# Patient Record
(demographics unavailable — no encounter records)

---

## 2025-01-29 NOTE — HISTORY OF PRESENT ILLNESS
[FreeTextEntry1] : Glynn is a pleasant 2  old boy who came today to my office with his mom for evaluation of his lower extremity.  Per mom he was born in emergency C section d/y umbilical cord around his neck.  He was in NICU for 9 days He was evaluated recently by neurology who diagnosed him with Autism and hypotonia. He is having PT/OT/SPEECH. still did not star ambulating or cursing, as difficulties pulling himself up.  Here for evaluation. Please refer to last note from previous treatment and further details. Last visit we provided him a script for bilateral AFO. Today, Glynn Is a 2-year-old boy with a history of delayed milestones and not walking.  He presents today primarily for a brace check.  He is currently wearing his new solid AFOs.  He only ambulates with the help of a walker.  He is in PT and OT.  He presents today with his mother for pediatric orthopedic follow-up exam.

## 2025-01-29 NOTE — ASSESSMENT
[FreeTextEntry1] : 2 years old boy with sever lower extremity hypotonia, developmental delay, Autism Today's visit included obtaining history from the child parent due to the child's age, the child could not be considered a reliable historian, requiring parent to act as independent historian.  His brace is fitting appropriately.  The recommendations time would be to continue the braces only wearing them when he is attempting to walk.  He may continue with PT and OT.  He will follow-up in 6 months for repeat examination. This entire examination and visit will be translated into [place]an.  We had a thorough talk in regard to the diagnosis, prognosis and treatment modalities.  All questions and concerns were addressed today. There was a verbal understanding from the parents and patient.   RASHEEDA Montoya have acted as a scribe and documented the above information for Dr. Price.   This note was generated using Dragon medical dictation software. A reasonable effort has been made for proofreading its contents; however, typos may still remain. If there are any questions or points of clarification needed, please do not hesitate to contact my office.   The above documentation completed by the scribe is an accurate record of both my words and actions.   Dr. Price.

## 2025-01-29 NOTE — BIRTH HISTORY
[] :  [Was child in NICU?] : Child was in NICU [Normal?] : pregnancy not normal [FreeTextEntry7] : 9 days

## 2025-01-29 NOTE — PHYSICAL EXAM
[FreeTextEntry1] : General: Patient is awake and alert and in no acute distress . well nourished,  Skin: The skin is intact, warm, pink, and dry over the area examined.    Eyes: normal conjunctiva, normal eyelids and pupils were equal and round.   ENT: normal ears, normal nose and normal lips.  Cardiovascular: There is brisk capillary refill in the digits of the affected extremity. They are symmetric pulses in the bilateral upper and lower extremities, positive peripheral pulses, brisk capillary refill, but no peripheral edema.  Respiratory: The patient is in no apparent respiratory distress. They're taking full deep breaths without use of accessory muscles or evidence of audible wheezes or stridor without the use of a stethoscope, normal respiratory effort.   Musculoskeletal: . Examination of bilateral lower extremities reveals wide symmetric abduction of bilateral hips to greater than 60. Prone internal rotation to 70, prone external rotation to 50. Thigh foot angle is 10 bilaterally.  Bilateral knees with full range of motion. Both knees are clinically stable. Negative Galeazzi.  Bilateral ankle dorsiflexion to +20 with knees extended. sever flexible flatfoot deformity. With standing, arches collapse and heels tip into valgus. This is flexible and easily correctable with toe dorsiflexion. Subtalar motion is full and free.  The brace is fitting appropriately with no skin breakdown or pressure marks. The padding is in the appropriate places. The straps are in the appropriate placement.

## 2025-01-29 NOTE — REASON FOR VISIT
[Initial Evaluation] : an initial evaluation [Mother] : mother [FreeTextEntry1] : Delay milestones, not walking [TWNoteComboBox1] : [place] citizen

## 2025-01-29 NOTE — REASON FOR VISIT
[Initial Evaluation] : an initial evaluation [Mother] : mother [FreeTextEntry1] : Delay milestones, not walking [TWNoteComboBox1] : [place]

## 2025-01-29 NOTE — ASSESSMENT
[FreeTextEntry1] : 2 years old boy with sever lower extremity hypotonia, developmental delay, Autism Today's visit included obtaining history from the child parent due to the child's age, the child could not be considered a reliable historian, requiring parent to act as independent historian.  His brace is fitting appropriately.  The recommendations time would be to continue the braces only wearing them when he is attempting to walk.  He may continue with PT and OT.  He will follow-up in 6 months for repeat examination. This entire examination and visit will be translated into Ivorian.  We had a thorough talk in regard to the diagnosis, prognosis and treatment modalities.  All questions and concerns were addressed today. There was a verbal understanding from the parents and patient.   RASHEEDA Montoya have acted as a scribe and documented the above information for Dr. Price.   This note was generated using Dragon medical dictation software. A reasonable effort has been made for proofreading its contents; however, typos may still remain. If there are any questions or points of clarification needed, please do not hesitate to contact my office.   The above documentation completed by the scribe is an accurate record of both my words and actions.   Dr. Price.

## 2025-01-29 NOTE — REVIEW OF SYSTEMS
[Change in Activity] : no change in activity [Fever Above 102] : no fever [Rash] : no rash [Itching] : no itching [Eye Pain] : no eye pain [Redness] : no redness [Earache] : no earache [Wheezing] : no wheezing [Cough] : no cough [Joint Pains] : no arthralgias [Joint Swelling] : no joint swelling [Appropriate Age Development] : development not appropriate for age

## 2025-06-27 NOTE — REASON FOR VISIT
[Initial Evaluation] : an initial evaluation [Mother] : mother [FreeTextEntry1] : Delay milestones, not walking indipendently [TWNoteComboBox1] : Citizen of [place]

## 2025-06-27 NOTE — HISTORY OF PRESENT ILLNESS
[FreeTextEntry1] : Glynn is a pleasant 2.10  old boy who came today to my office with his mom for  further management  of his lower extremity.  Per mom he was born in emergency C section d/y umbilical cord around his neck.  He was in NICU for 9 days He was evaluated recently by neurology who diagnosed him with Autism and hypotonia. He is having PT/OT/SPEECH. still did not star ambulating or cursing, as difficulties pulling himself up.  Here for evaluation. Please refer to last note from previous treatment and further details. Last visit we provided him a script for bilateral AFO. but mom is not able to get them He only ambulates with the help of a walker.  He is in PT and OT.  He presents today with his mother for pediatric orthopedic follow-up exam.

## 2025-06-27 NOTE — ASSESSMENT
[FreeTextEntry1] : 2.10 years old boy with sever lower extremity hypotonia, developmental delay, Autism Today's visit included obtaining history from the child parent due to the child's age, the child could not be considered a reliable historian, requiring parent to act as independent historian.  He outgrown his brace and needs a new brace to accommodate his growth.  The recommendations time would be to continue the braces only wearing them when he is attempting to walk. New script was provided today He should  continue with PT and OT.  He will follow-up in 6 months for repeat examination. This entire examination and visit will be translated into Macanese.  We had a thorough talk in regard to the diagnosis, prognosis and treatment modalities.  All questions and concerns were addressed today. There was a verbal understanding from the parents and patient.   RASHEEDA Montoya have acted as a scribe and documented the above information for Dr. Price.   This note was generated using Dragon medical dictation software. A reasonable effort has been made for proofreading its contents; however, typos may still remain. If there are any questions or points of clarification needed, please do not hesitate to contact my office.   The above documentation completed by the scribe is an accurate record of both my words and actions.   Dr. Price.

## 2025-06-27 NOTE — REASON FOR VISIT
[Initial Evaluation] : an initial evaluation [Mother] : mother [FreeTextEntry1] : Delay milestones, not walking indipendently [TWNoteComboBox1] : [place]

## 2025-06-27 NOTE — END OF VISIT
[FreeTextEntry3] : I, Domenico Price MD, I personally performed the services described in the documentation, reviewed the documentation recorded by the scribe in my presence and it accurately and completely records my words and actions

## 2025-06-27 NOTE — ASSESSMENT
[FreeTextEntry1] : 2.10 years old boy with sever lower extremity hypotonia, developmental delay, Autism Today's visit included obtaining history from the child parent due to the child's age, the child could not be considered a reliable historian, requiring parent to act as independent historian.  He outgrown his brace and needs a new brace to accommodate his growth.  The recommendations time would be to continue the braces only wearing them when he is attempting to walk. New script was provided today He should  continue with PT and OT.  He will follow-up in 6 months for repeat examination. This entire examination and visit will be translated into Kosovan.  We had a thorough talk in regard to the diagnosis, prognosis and treatment modalities.  All questions and concerns were addressed today. There was a verbal understanding from the parents and patient.   RASHEEDA Montoya have acted as a scribe and documented the above information for Dr. Price.   This note was generated using Dragon medical dictation software. A reasonable effort has been made for proofreading its contents; however, typos may still remain. If there are any questions or points of clarification needed, please do not hesitate to contact my office.   The above documentation completed by the scribe is an accurate record of both my words and actions.   Dr. Price.